# Patient Record
Sex: MALE | Race: WHITE | NOT HISPANIC OR LATINO | Employment: FULL TIME | URBAN - METROPOLITAN AREA
[De-identification: names, ages, dates, MRNs, and addresses within clinical notes are randomized per-mention and may not be internally consistent; named-entity substitution may affect disease eponyms.]

---

## 2022-05-31 ENCOUNTER — HOSPITAL ENCOUNTER (EMERGENCY)
Facility: HOSPITAL | Age: 42
Discharge: HOME | End: 2022-05-31
Attending: EMERGENCY MEDICINE

## 2022-05-31 VITALS
TEMPERATURE: 97.1 F | HEART RATE: 98 BPM | DIASTOLIC BLOOD PRESSURE: 75 MMHG | OXYGEN SATURATION: 96 % | RESPIRATION RATE: 18 BRPM | SYSTOLIC BLOOD PRESSURE: 140 MMHG

## 2022-05-31 DIAGNOSIS — T15.90XA EYE FOREIGN BODY: Primary | ICD-10-CM

## 2022-05-31 PROCEDURE — 99283 EMERGENCY DEPT VISIT LOW MDM: CPT

## 2022-05-31 PROCEDURE — 99284 EMERGENCY DEPT VISIT MOD MDM: CPT | Performed by: EMERGENCY MEDICINE

## 2022-05-31 PROCEDURE — 65205 REMOVE FOREIGN BODY FROM EYE: CPT | Performed by: EMERGENCY MEDICINE

## 2022-05-31 RX ORDER — OXYCODONE HYDROCHLORIDE 5 MG/1
5 TABLET ORAL ONCE
Status: COMPLETED | OUTPATIENT
Start: 2022-05-31 | End: 2022-05-31

## 2022-05-31 RX ORDER — TETRACAINE HYDROCHLORIDE 5 MG/ML
1 SOLUTION OPHTHALMIC ONCE
Status: DISCONTINUED | OUTPATIENT
Start: 2022-05-31 | End: 2022-05-31

## 2022-05-31 RX ORDER — TETRACAINE HYDROCHLORIDE 5 MG/ML
2 SOLUTION OPHTHALMIC ONCE
Status: COMPLETED | OUTPATIENT
Start: 2022-05-31 | End: 2022-05-31

## 2022-05-31 RX ORDER — IBUPROFEN 400 MG/1
800 TABLET ORAL ONCE
Status: COMPLETED | OUTPATIENT
Start: 2022-05-31 | End: 2022-05-31

## 2022-05-31 RX ADMIN — TETRACAINE HYDROCHLORIDE 2 DROP: 5 SOLUTION OPHTHALMIC at 01:10

## 2022-05-31 RX ADMIN — IBUPROFEN 800 MG: 400 TABLET, FILM COATED ORAL at 01:40

## 2022-05-31 RX ADMIN — OXYCODONE HYDROCHLORIDE 5 MG: 5 TABLET ORAL at 01:40

## 2022-05-31 RX ADMIN — FLUORESCEIN SODIUM 1 STRIP: 1 STRIP OPHTHALMIC at 01:31

## 2022-05-31 NOTE — ED PROVIDER NOTES
History  Chief Complaint   Patient presents with   [address] presents with bilateral eye redness  Left worse than right with eye pain in left eye only  Patient reports symptom onset was yesterday night after "putting together a swingset"  Denies any loss of vision  Patient is a 60-year-old male no past medical history presenting with eye redness  Patient notes redness to the left eye as well as burning pain and sensation of foreign body  He notes tearing denies purulent drainage  States that this began yesterday roughly 2 hours after putting a swing set together  He states that is constant worse with blinking but not painful with eye movement  States urgent care gave ciprofloxacin drops and that he started them today and feels that the pain has worsened since beginning them  Does wear glasses and contacts but does not have his contacts and no  States he took Aleve for the pain with no relief  Denies any changes to his vision  Prior to Admission Medications   Prescriptions Last Dose Informant Patient Reported? Taking?   ibuprofen (MOTRIN) 600 mg tablet   No No   Sig: Take 1 tablet by mouth every 6 (six) hours as needed for mild pain for up to 10 days      Facility-Administered Medications: None       History reviewed  No pertinent past medical history  Past Surgical History:   Procedure Laterality Date    HERNIA REPAIR         History reviewed  No pertinent family history  I have reviewed and agree with the history as documented  E-Cigarette/Vaping     E-Cigarette/Vaping Substances     Social History     Tobacco Use    Smoking status: Current Every Day Smoker     Packs/day: 1 00     Types: Cigarettes    Smokeless tobacco: Never Used   Substance Use Topics    Alcohol use: Not Currently    Drug use: Never       Review of Systems   All other systems reviewed and are negative  Physical Exam  Physical Exam  Vitals reviewed     Constitutional:       General: He is not in acute distress  Appearance: Normal appearance  He is not ill-appearing  HENT:      Mouth/Throat:      Mouth: Mucous membranes are moist    Eyes:      Extraocular Movements: Extraocular movements intact  Pupils: Pupils are equal, round, and reactive to light  Comments: R left-sided injection conjunctivae with foreign body visualized overlying pupil, no proptosis, no corneal abrasions visualized under fluorescein stain   Cardiovascular:      Rate and Rhythm: Normal rate  Pulmonary:      Effort: Pulmonary effort is normal    Abdominal:      General: Abdomen is flat  Musculoskeletal:         General: No swelling  Normal range of motion  Cervical back: Neck supple  Skin:     General: Skin is warm and dry  Neurological:      General: No focal deficit present  Mental Status: He is alert  Psychiatric:         Mood and Affect: Mood normal          Vital Signs  ED Triage Vitals [05/31/22 0055]   Temperature Pulse Respirations Blood Pressure SpO2   (!) 97 1 °F (36 2 °C) 98 18 140/75 96 %      Temp Source Heart Rate Source Patient Position - Orthostatic VS BP Location FiO2 (%)   Temporal Monitor Sitting Left arm --      Pain Score       --           Vitals:    05/31/22 0055   BP: 140/75   Pulse: 98   Patient Position - Orthostatic VS: Sitting         Visual Acuity  Visual Acuity    Flowsheet Row Most Recent Value   Visual acuity R eye is 20/70   Visual acuity Left eye is 20/70   Visual acuity in both eyes is 20/50   Wearing corrective eyewear/lenses?  No   No corrective eyewear/lenses Yes          ED Medications  Medications   fluorescein sodium sterile ophthalmic strip 1 strip (1 strip Left Eye Given by Other 5/31/22 0131)   tetracaine 0 5 % ophthalmic solution 2 drop (2 drops Left Eye Given 5/31/22 0110)   oxyCODONE (ROXICODONE) IR tablet 5 mg (5 mg Oral Given 5/31/22 0140)   ibuprofen (MOTRIN) tablet 800 mg (800 mg Oral Given 5/31/22 0140)       Diagnostic Studies  Results Reviewed     None No orders to display              Procedures  Procedures         ED Course  ED Course as of 06/04/22 1515   Tue May 31, 2022   0131 Unable to remove foreign body, no corneal abrasions visualized under fluorescein stain, patient has ciprofloxacin drops from Urgent Care, will give outpatient ophthalmology follow-up tomorrow  SBIRT 20yo+    Flowsheet Row Most Recent Value   SBIRT (23 yo +)    In order to provide better care to our patients, we are screening all of our patients for alcohol and drug use  Would it be okay to ask you these screening questions? Yes Filed at: 05/31/2022 0146   Initial Alcohol Screen: US AUDIT-C     1  How often do you have a drink containing alcohol? 0 Filed at: 05/31/2022 0146   2  How many drinks containing alcohol do you have on a typical day you are drinking? 0 Filed at: 05/31/2022 0146   3a  Male UNDER 65: How often do you have five or more drinks on one occasion? 0 Filed at: 05/31/2022 0146   3b  FEMALE Any Age, or MALE 65+: How often do you have 4 or more drinks on one occassion? 0 Filed at: 05/31/2022 0146   Audit-C Score 0 Filed at: 05/31/2022 1062   ZEHRA: How many times in the past year have you    Used an illegal drug or used a prescription medication for non-medical reasons? Never Filed at: 05/31/2022 0146                    MDM  Number of Diagnoses or Management Options  Eye foreign body  Diagnosis management comments: Patient is a 68-year-old male no past medical history presenting with foreign body in left eye  Patient is well-appearing bedside stable vitals and in no acute distress  He has no pain with movement, no proptosis, mildly injected conjunctiva with foreign body visualized  Have attempted removal with cotton swab however have been unable to remove and will give emergent eye center follow-up and patient is already with antibiotic drops from urgent care    Have discussed return precautions he states he understands  Disposition  Final diagnoses:   Eye foreign body     Time reflects when diagnosis was documented in both MDM as applicable and the Disposition within this note     Time User Action Codes Description Comment    5/31/2022  1:32 AM Gabriella Lynne [facility] foreign body       ED Disposition     ED Disposition   Discharge    Condition   Stable    Date/Time   Tue May 31, 2022  1:32 AM    Comment   Nichol Henson discharge to home/self care  Follow-up Information     Follow up With Specialties Details Why [address] Ophthalmology Schedule an appointment as soon as possible for a visit in 1 day  [id number] [place] [address] [address] [place] [address]      Ynes Birmingham MD Ophthalmology Schedule an appointment as soon as possible for a visit   Roberto Caputo [address]  [phone]            Discharge Medication List as of 5/31/2022  1:34 AM      CONTINUE these medications which have NOT CHANGED    Details   ibuprofen (MOTRIN) 600 mg tablet Take 1 tablet by mouth every 6 (six) hours as needed for mild pain for up to 10 days, Starting 12/26/2016, Until Thu 1/5/17, Print             No discharge procedures on file      PDMP Review     None          ED Provider  Electronically Signed by           Sarah Weinberg,   06/04/22 4845